# Patient Record
Sex: FEMALE | Race: WHITE | NOT HISPANIC OR LATINO | Employment: UNEMPLOYED | URBAN - METROPOLITAN AREA
[De-identification: names, ages, dates, MRNs, and addresses within clinical notes are randomized per-mention and may not be internally consistent; named-entity substitution may affect disease eponyms.]

---

## 2018-10-04 ENCOUNTER — OFFICE VISIT (OUTPATIENT)
Dept: URGENT CARE | Facility: MEDICAL CENTER | Age: 38
End: 2018-10-04
Payer: COMMERCIAL

## 2018-10-04 VITALS
OXYGEN SATURATION: 99 % | WEIGHT: 125 LBS | TEMPERATURE: 97.7 F | BODY MASS INDEX: 22.15 KG/M2 | HEIGHT: 63 IN | SYSTOLIC BLOOD PRESSURE: 131 MMHG | RESPIRATION RATE: 16 BRPM | DIASTOLIC BLOOD PRESSURE: 84 MMHG | HEART RATE: 80 BPM

## 2018-10-04 DIAGNOSIS — K04.7 DENTAL ABSCESS: Primary | ICD-10-CM

## 2018-10-04 PROCEDURE — G0382 LEV 3 HOSP TYPE B ED VISIT: HCPCS | Performed by: FAMILY MEDICINE

## 2018-10-04 PROCEDURE — 99283 EMERGENCY DEPT VISIT LOW MDM: CPT | Performed by: FAMILY MEDICINE

## 2018-10-04 RX ORDER — AMOXICILLIN 500 MG/1
500 TABLET, FILM COATED ORAL 3 TIMES DAILY
Qty: 30 TABLET | Refills: 0 | Status: SHIPPED | OUTPATIENT
Start: 2018-10-04 | End: 2018-10-14

## 2018-10-04 NOTE — PATIENT INSTRUCTIONS
1  Take Amox 500mg  2 as initial dose then 1 tablet 3x daily x 10 days  2  Motrin as needed for pain  3  Dental consult ASAP

## 2018-10-04 NOTE — PROGRESS NOTES
[facility]        NAME: Deborah Thomson is a 41 y o  female  : 1980    MRN: [id number]  DATE: 2018  TIME: 12:20 PM    Assessment and Plan   Dental abscess [K04 7]  1  Dental abscess  amoxicillin (AMOXIL) 500 MG tablet         Patient Instructions     1  Take Amox 500mg  2 as initial dose then 1 tablet 3x daily x 10 days  2  Motrin as needed for pain  3  Dental consult ASAP  Chief Complaint     Chief Complaint   Patient presents with    Dental Pain     lower jaw pain/swelling on left side/states sore throat/states getting fevers/Tylenol and Motrin rotation with no relief         History of Present Illness       The patient is a 51-year-old female presents with a 2 day history of increasing left-sided dental pain and jaw swelling  She denies any drainage from her gum or teeth but has had a low-grade fever over the past 24 hours  Review of Systems   Review of Systems   Constitutional: Positive for fever  HENT: Positive for dental problem and mouth sores  Respiratory: Negative for cough  Gastrointestinal: Negative  Current Medications       Current Outpatient Prescriptions:     buprenorphine-naloxone (SUBOXONE) 8-2 mg per SL tablet, Place 0 5 tablets under the tongue daily  , Disp: , Rfl:     amoxicillin (AMOXIL) 500 MG tablet, Take 1 tablet (500 mg total) by mouth 3 (three) times a day for 10 days, Disp: 30 tablet, Rfl: 0    Amphetamine-Dextroamphetamine (ADDERALL PO), Take 30 mg by mouth daily  , Disp: , Rfl:     Current Allergies     Allergies as of 10/04/2018 - Reviewed 10/04/2018   Allergen Reaction Noted    Bactrim [sulfamethoxazole-trimethoprim]  2016    Levaquin [levofloxacin in d5w]  2016    Toradol [ketorolac tromethamine]  2016            The following portions of the patient's history were reviewed and updated as appropriate: allergies, current medications, past family history, past medical history, past social history, past surgical history and problem list      Past Medical History:   Diagnosis Date    Drug abuse ([address] )     Psychiatric disorder     anxiety and ADHD       Past Surgical History:   Procedure Laterality Date     SECTION         No family history on file  Medications have been verified  Objective   /84   Pulse 80   Temp 97 7 °F (36 5 °C)   Resp 16   Ht 5' 3" (1 6 m)   Wt 56 7 kg (125 lb)   SpO2 99%   BMI 22 14 kg/m²        Physical Exam     Physical Exam   Constitutional: She appears well-developed and well-nourished  No distress  HENT:   Head: Normocephalic and atraumatic  Right Ear: Tympanic membrane normal    Left Ear: Tympanic membrane normal    Nose: Nose normal    Mouth/Throat:       Cardiovascular: Normal rate, regular rhythm and normal heart sounds  No murmur heard    Pulmonary/Chest: Effort normal and breath sounds normal

## 2019-09-11 ENCOUNTER — OFFICE VISIT (OUTPATIENT)
Dept: URGENT CARE | Facility: MEDICAL CENTER | Age: 39
End: 2019-09-11
Payer: COMMERCIAL

## 2019-09-11 VITALS
HEIGHT: 63 IN | WEIGHT: 143.8 LBS | HEART RATE: 86 BPM | BODY MASS INDEX: 25.48 KG/M2 | DIASTOLIC BLOOD PRESSURE: 80 MMHG | OXYGEN SATURATION: 100 % | SYSTOLIC BLOOD PRESSURE: 128 MMHG | RESPIRATION RATE: 18 BRPM | TEMPERATURE: 99 F

## 2019-09-11 DIAGNOSIS — K02.9 PAIN DUE TO DENTAL CARIES: Primary | ICD-10-CM

## 2019-09-11 PROCEDURE — G0382 LEV 3 HOSP TYPE B ED VISIT: HCPCS | Performed by: PHYSICIAN ASSISTANT

## 2019-09-11 PROCEDURE — 99283 EMERGENCY DEPT VISIT LOW MDM: CPT | Performed by: PHYSICIAN ASSISTANT

## 2019-09-11 PROCEDURE — 99213 OFFICE O/P EST LOW 20 MIN: CPT | Performed by: PHYSICIAN ASSISTANT

## 2019-09-11 RX ORDER — AMOXICILLIN AND CLAVULANATE POTASSIUM 875; 125 MG/1; MG/1
1 TABLET, FILM COATED ORAL EVERY 12 HOURS SCHEDULED
Qty: 20 TABLET | Refills: 0 | Status: SHIPPED | OUTPATIENT
Start: 2019-09-11 | End: 2019-09-21

## 2019-09-11 RX ORDER — AMOXICILLIN AND CLAVULANATE POTASSIUM 875; 125 MG/1; MG/1
1 TABLET, FILM COATED ORAL EVERY 12 HOURS SCHEDULED
Qty: 20 TABLET | Refills: 0 | Status: SHIPPED | OUTPATIENT
Start: 2019-09-11 | End: 2019-09-11 | Stop reason: CLARIF

## 2019-09-12 NOTE — PROGRESS NOTES
[facility]        NAME: Maira Wasserman is a 42 y o  female  : 1980    MRN: [id number]  DATE: 2019  TIME: 9:05 PM    Assessment and Plan   Pain due to dental caries [K02 9]  1  Pain due to dental caries  amoxicillin-clavulanate (AUGMENTIN) 875-125 mg per tablet    DISCONTINUED: amoxicillin-clavulanate (AUGMENTIN) 875-125 mg per tablet     List of dental clinics provided to patient  Advised to continue Motrin and take Augmentin as directed  Encouraged dental follow up ASAP  Patient Instructions       Continue to take Motrin for pain   take antibiotic as instructed   follow up with dentist ASAP      Chief Complaint     Chief Complaint   Patient presents with    Dental Pain     Lower dental pain x 5 days  History of Present Illness        Patient is a 32-year-old female who presents today with lower jaw dental pain  This began approximately 5 days ago  Patient has known dental issues and caries, she knows she needs these pole but has not had a chance to get to the dentist due to financial issues  She reports low-grade fever as well  She is able to swallow without difficulty  Review of Systems   Review of Systems   Constitutional: Positive for fever  HENT: Positive for dental problem  Negative for mouth sores and trouble swallowing  Respiratory: Negative for shortness of breath  Cardiovascular: Negative for chest pain  Current Medications       Current Outpatient Medications:     amoxicillin-clavulanate (AUGMENTIN) 875-125 mg per tablet, Take 1 tablet by mouth every 12 (twelve) hours for 10 days, Disp: 20 tablet, Rfl: 0    Amphetamine-Dextroamphetamine (ADDERALL PO), Take 30 mg by mouth daily  , Disp: , Rfl:     buprenorphine-naloxone (SUBOXONE) 8-2 mg per SL tablet, Place 0 5 tablets under the tongue daily  , Disp: , Rfl:     Current Allergies     Allergies as of 2019 - Reviewed 2019   Allergen Reaction Noted    Bactrim [sulfamethoxazole-trimethoprim]  2016    Levaquin [levofloxacin in d5w]  2016    Toradol [ketorolac tromethamine]  2016            The following portions of the patient's history were reviewed and updated as appropriate: allergies, current medications, past family history, past medical history, past social history, past surgical history and problem list      Past Medical History:   Diagnosis Date    Drug abuse ([facility] [address] )     Psychiatric disorder     anxiety and ADHD       Past Surgical History:   Procedure Laterality Date     SECTION      DENTAL SURGERY         Family History   Problem Relation Age of Onset    COPD Mother          Medications have been verified  Objective   /80   Pulse 86   Temp 99 °F (37 2 °C) (Temporal)   Resp 18   Ht 5' 3" (1 6 m)   Wt 65 2 kg (143 lb 12 8 oz)   LMP 2019 (Exact Date)   SpO2 100%   BMI 25 47 kg/m²        Physical Exam     Physical Exam   Constitutional: She appears well-developed and well-nourished  HENT:   Head: Normocephalic and atraumatic  Mouth/Throat: Uvula is midline, oropharynx is clear and moist and mucous membranes are normal  She has dentures  Abnormal dentition  Dental caries present  No dental abscesses  Cardiovascular: Normal rate and regular rhythm     Pulmonary/Chest: Effort normal and breath sounds normal

## 2019-09-12 NOTE — PATIENT INSTRUCTIONS
Continue to take Motrin for pain   take antibiotic as instructed   follow up with dentist ASAP    Dental Caries   WHAT YOU NEED TO KNOW:   Dental caries are also called cavities  Cavities are caused by bacteria  When the bacteria in tooth plaque (sticky film) mix with certain types of carbohydrate, this creates acid  The acid breaks down areas of enamel, which covers the outside of a tooth  This creates a small hole in the tooth called a cavity  DISCHARGE INSTRUCTIONS:   Return to the emergency department if:   · Your face, jaw, cheek, eye, or neck begin to swell  Contact your dentist if:   · You have a fever  · Your tooth pain gets worse  · You have questions or concerns about your condition or care  Follow up with your dentist as directed:  Write down your questions so you remember to ask them during your visits  Prevent dental caries:   · Brush your teeth at least 2 times a day with fluoride toothpaste  · Use dental floss to clean between your teeth at least once a day  · Rinse your mouth with water or mouthwash after meals and snacks  · Chew sugarless gum after meals and snacks  · See your dentist regularly for dental cleanings and oral exams  © 2017 [address] is for End User's use only and may not be sold, redistributed or otherwise used for commercial purposes  All illustrations and images included in CareNotes® are the copyrighted property of [employer]  or [employer] Burrough[employer]  The above information is an  only  It is not intended as medical advice for individual conditions or treatments  Talk to your doctor, nurse or pharmacist before following any medical regimen to see if it is safe and effective for you

## 2020-06-01 ENCOUNTER — OFFICE VISIT (OUTPATIENT)
Dept: URGENT CARE | Facility: MEDICAL CENTER | Age: 40
End: 2020-06-01
Payer: COMMERCIAL

## 2020-06-01 DIAGNOSIS — K04.7 DENTAL ABSCESS: Primary | ICD-10-CM

## 2020-06-01 PROCEDURE — G0382 LEV 3 HOSP TYPE B ED VISIT: HCPCS | Performed by: PHYSICIAN ASSISTANT

## 2020-06-01 PROCEDURE — 99213 OFFICE O/P EST LOW 20 MIN: CPT | Performed by: PHYSICIAN ASSISTANT

## 2020-06-01 PROCEDURE — 99283 EMERGENCY DEPT VISIT LOW MDM: CPT | Performed by: PHYSICIAN ASSISTANT

## 2020-06-02 ENCOUNTER — DOCUMENTATION (OUTPATIENT)
Dept: URGENT CARE | Facility: MEDICAL CENTER | Age: 40
End: 2020-06-02

## 2020-09-29 ENCOUNTER — OFFICE VISIT (OUTPATIENT)
Dept: URGENT CARE | Facility: MEDICAL CENTER | Age: 40
End: 2020-09-29
Payer: COMMERCIAL

## 2020-09-29 VITALS
TEMPERATURE: 97.7 F | BODY MASS INDEX: 26.19 KG/M2 | OXYGEN SATURATION: 98 % | SYSTOLIC BLOOD PRESSURE: 121 MMHG | WEIGHT: 153.4 LBS | HEIGHT: 64 IN | RESPIRATION RATE: 18 BRPM | DIASTOLIC BLOOD PRESSURE: 85 MMHG | HEART RATE: 94 BPM

## 2020-09-29 DIAGNOSIS — K04.7 DENTAL ABSCESS: Primary | ICD-10-CM

## 2020-09-29 PROCEDURE — 99213 OFFICE O/P EST LOW 20 MIN: CPT

## 2020-09-29 PROCEDURE — 99283 EMERGENCY DEPT VISIT LOW MDM: CPT

## 2020-09-29 PROCEDURE — G0382 LEV 3 HOSP TYPE B ED VISIT: HCPCS

## 2020-09-29 RX ORDER — AMOXICILLIN 500 MG/1
500 CAPSULE ORAL EVERY 8 HOURS SCHEDULED
Qty: 21 CAPSULE | Refills: 0 | Status: SHIPPED | OUTPATIENT
Start: 2020-09-29 | End: 2020-10-06

## 2020-09-29 NOTE — PROGRESS NOTES
[facility]        NAME: Fredi Suggs is a 47 y o  female  : 1980    MRN: [id number]  DATE: 2020  TIME: 5:42 PM    Assessment and Plan   Dental abscess [K04 7]  1  Dental abscess  amoxicillin (AMOXIL) 500 mg capsule         Patient Instructions     Dental abscess  Amoxicillin as directed  Follow up with PCP in 3-5 days  Proceed to  ER if symptoms worsen  Chief Complaint     Chief Complaint   Patient presents with    Dental Pain     LOWER RIGHT JAW         History of Present Illness       Patient c/o dental pain and swelling x 5 days  States she has dental decay and now it is swollen and painful  Denies chest pain, SOB      Review of Systems   Review of Systems   Constitutional: Negative  HENT: Positive for dental problem  Eyes: Negative  Respiratory: Negative  Negative for cough, chest tightness, shortness of breath, wheezing and stridor  Cardiovascular: Negative  Negative for chest pain, palpitations and leg swelling  Current Medications       Current Outpatient Medications:     amoxicillin (AMOXIL) 500 mg capsule, Take 1 capsule (500 mg total) by mouth every 8 (eight) hours for 7 days, Disp: 21 capsule, Rfl: 0    Amphetamine-Dextroamphetamine (ADDERALL PO), Take 30 mg by mouth daily  , Disp: , Rfl:     buprenorphine-naloxone (SUBOXONE) 8-2 mg per SL tablet, Place 0 5 tablets under the tongue daily  , Disp: , Rfl:     Current Allergies     Allergies as of 2020 - Reviewed 2020   Allergen Reaction Noted    Bactrim [sulfamethoxazole-trimethoprim]  2016    Levaquin [levofloxacin in d5w]  2016    Toradol [ketorolac tromethamine]  2016            The following portions of the patient's history were reviewed and updated as appropriate: allergies, current medications, past family history, past medical history, past social history, past surgical history and problem list      Past Medical History:   Diagnosis Date    Drug abuse [facility])     Psychiatric disorder     anxiety and ADHD       Past Surgical History:   Procedure Laterality Date     SECTION      DENTAL SURGERY         Family History   Problem Relation Age of Onset    COPD Mother          Medications have been verified  Objective   /85   Pulse 94   Temp 97 7 °F (36 5 °C)   Resp 18   Ht 5' 4" (1 626 m)   Wt 69 6 kg (153 lb 6 4 oz)   SpO2 98%   BMI 26 33 kg/m²        Physical Exam     Physical Exam  Constitutional:       Appearance: Normal appearance  She is well-developed and normal weight  HENT:      Head: Normocephalic and atraumatic  Right Ear: Tympanic membrane, ear canal and external ear normal       Left Ear: Tympanic membrane, ear canal and external ear normal       Nose: Nose normal       Mouth/Throat:      Dentition: Abnormal dentition  Dental tenderness, dental caries and dental abscesses present  Pharynx: No oropharyngeal exudate  Neck:      Musculoskeletal: Normal range of motion and neck supple  Cardiovascular:      Rate and Rhythm: Normal rate and regular rhythm  Heart sounds: Normal heart sounds  Pulmonary:      Effort: Pulmonary effort is normal  No respiratory distress  Breath sounds: Normal breath sounds  No wheezing or rales  Chest:      Chest wall: No tenderness  Lymphadenopathy:      Cervical: No cervical adenopathy  Neurological:      Mental Status: She is alert

## 2020-09-29 NOTE — PATIENT INSTRUCTIONS
Dental abscess  Amoxicillin as directed  Follow up with PCP in 3-5 days  Proceed to  ER if symptoms worsen  Dental Abscess   WHAT YOU NEED TO KNOW:   A dental abscess is a collection of pus in or around a tooth  A dental abscess is caused by bacteria  The bacteria usually enter the tooth when the enamel (outer part of the tooth) is damaged by tooth decay  Bacteria may also enter the tooth through a break or chip in the tooth, or a cut in the gum  Food particles that are stuck between the teeth for a long time may also lead to an abscess  DISCHARGE INSTRUCTIONS:   Return to the emergency department if:   · You have severe pain  · You have trouble breathing because of pain or swelling  Contact your healthcare provider if:   · Your symptoms get worse, even after treatment  · Your mouth is bleeding  · You cannot eat or drink because of pain or swelling  · Your abscess returns  · You have an injury that causes a crack in your tooth  · You have questions or concerns about your condition or care  Medicines: You may  need any of the following:  · Antibiotics  help treat a bacterial infection  · NSAIDs , such as ibuprofen, help decrease swelling, pain, and fever  This medicine is available with or without a doctor's order  NSAIDs can cause stomach bleeding or kidney problems in certain people  If you take blood thinner medicine, always ask your healthcare provider if NSAIDs are safe for you  Always read the medicine label and follow directions  · Acetaminophen  decreases pain and fever  It is available without a doctor's order  Ask how much to take and how often to take it  Follow directions  Read the labels of all other medicines you are using to see if they also contain acetaminophen, or ask your doctor or pharmacist  Acetaminophen can cause liver damage if not taken correctly  Do not use more than 4 grams (4,000 milligrams) total of acetaminophen in one day       · Prescription pain medicine  may be given  Ask your healthcare provider how to take this medicine safely  Some prescription pain medicines contain acetaminophen  Do not take other medicines that contain acetaminophen without talking to your healthcare provider  Too much acetaminophen may cause liver damage  Prescription pain medicine may cause constipation  Ask your healthcare provider how to prevent or treat constipation  · Take your medicine as directed  Contact your healthcare provider if you think your medicine is not helping or if you have side effects  Tell him of her if you are allergic to any medicine  Keep a list of the medicines, vitamins, and herbs you take  Include the amounts, and when and why you take them  Bring the list or the pill bottles to follow-up visits  Carry your medicine list with you in case of an emergency  Self-care:   · Rinse your mouth every 2 hours with salt water  This will help keep the area clean  · Gently brush your teeth twice a day with a soft tooth brush  This will help keep the area clean  · Eat soft foods as directed  Soft foods may cause less pain  Examples include applesauce, yogurt, and cooked pasta  Ask your healthcare provider how long to follow this instruction  · Apply a warm compress to your tooth or gum  Use a cotton ball or gauze soaked in warm water  Remove the compress in 10 minutes or when it becomes cool  Repeat 3 times a day  Prevent another abscess:   · Brush your teeth at least 2 times a day with fluoride toothpaste  · Use dental floss to clean between your teeth at least once a day  · Rinse your mouth with water or mouthwash after meals and snacks  · Chew sugarless gum after meals and snacks  · Limit foods that are sticky and high in sugar such as raisons  Also limit drinks high in sugar, such as soda  · See your dentist every 6 months for dental cleanings and oral exams  Follow up with your healthcare provider in 24 hours:   Your healthcare provider will need to check your teeth and gums  Write down your questions so you remember to ask them during your visits  © 2017 [facility] Information is for End User's use only and may not be sold, redistributed or otherwise used for commercial purposes  All illustrations and images included in CareNotes® are the copyrighted property of [employer]  or Al Burroughs  The above information is an  only  It is not intended as medical advice for individual conditions or treatments  Talk to your doctor, nurse or pharmacist before following any medical regimen to see if it is safe and effective for you

## 2020-10-15 ENCOUNTER — OFFICE VISIT (OUTPATIENT)
Dept: URGENT CARE | Facility: MEDICAL CENTER | Age: 40
End: 2020-10-15
Payer: COMMERCIAL

## 2020-10-15 VITALS
OXYGEN SATURATION: 98 % | WEIGHT: 158 LBS | BODY MASS INDEX: 28 KG/M2 | DIASTOLIC BLOOD PRESSURE: 63 MMHG | TEMPERATURE: 97.8 F | RESPIRATION RATE: 18 BRPM | SYSTOLIC BLOOD PRESSURE: 112 MMHG | HEIGHT: 63 IN | HEART RATE: 92 BPM

## 2020-10-15 DIAGNOSIS — K04.7 DENTAL ABSCESS: Primary | ICD-10-CM

## 2020-10-15 PROCEDURE — G0382 LEV 3 HOSP TYPE B ED VISIT: HCPCS | Performed by: PHYSICIAN ASSISTANT

## 2020-10-15 PROCEDURE — 99213 OFFICE O/P EST LOW 20 MIN: CPT | Performed by: PHYSICIAN ASSISTANT

## 2020-10-15 PROCEDURE — 99283 EMERGENCY DEPT VISIT LOW MDM: CPT | Performed by: PHYSICIAN ASSISTANT

## 2020-10-15 RX ORDER — IBUPROFEN 600 MG/1
600 TABLET ORAL EVERY 6 HOURS PRN
COMMUNITY

## 2020-10-15 RX ORDER — AMOXICILLIN AND CLAVULANATE POTASSIUM 875; 125 MG/1; MG/1
1 TABLET, FILM COATED ORAL EVERY 12 HOURS SCHEDULED
Qty: 14 TABLET | Refills: 0 | Status: SHIPPED | OUTPATIENT
Start: 2020-10-15 | End: 2020-10-22

## 2021-02-10 ENCOUNTER — OFFICE VISIT (OUTPATIENT)
Dept: URGENT CARE | Facility: MEDICAL CENTER | Age: 41
End: 2021-02-10
Payer: COMMERCIAL

## 2021-02-10 VITALS
RESPIRATION RATE: 18 BRPM | SYSTOLIC BLOOD PRESSURE: 127 MMHG | DIASTOLIC BLOOD PRESSURE: 73 MMHG | WEIGHT: 154 LBS | OXYGEN SATURATION: 97 % | TEMPERATURE: 98.2 F | HEIGHT: 64 IN | BODY MASS INDEX: 26.29 KG/M2 | HEART RATE: 90 BPM

## 2021-02-10 DIAGNOSIS — R21 RASH: Primary | ICD-10-CM

## 2021-02-10 PROCEDURE — 99213 OFFICE O/P EST LOW 20 MIN: CPT | Performed by: PHYSICIAN ASSISTANT

## 2021-02-10 PROCEDURE — G0382 LEV 3 HOSP TYPE B ED VISIT: HCPCS | Performed by: PHYSICIAN ASSISTANT

## 2021-02-10 PROCEDURE — 99283 EMERGENCY DEPT VISIT LOW MDM: CPT | Performed by: PHYSICIAN ASSISTANT

## 2021-02-10 RX ORDER — PREDNISONE 20 MG/1
40 TABLET ORAL DAILY
Qty: 10 TABLET | Refills: 0 | Status: SHIPPED | OUTPATIENT
Start: 2021-02-10 | End: 2021-02-15

## 2021-02-10 NOTE — PROGRESS NOTES
[facility]        NAME: Sorin Lund is a 46 y o  female  : 1980    MRN: [id number]  DATE: February 10, 2021  TIME: 2:20 PM    Assessment and Plan   Rash [R21]  1  Rash  predniSONE 20 mg tablet         Patient Instructions     Rash  Prednisone 40 mg daily x 5 days  Follow up with PCP in 3-5 days  Proceed to  ER if symptoms worsen  Chief Complaint     Chief Complaint   Patient presents with    Rash     x2 weeks rash started to back   itchy and  burns  History of Present Illness       47 y/o female presents c/o itchy rash to upper body  States the rash started after eating supplemental herbs  Denies wheezing, difficulty swallowing      Review of Systems   Review of Systems   Constitutional: Negative  HENT: Negative  Eyes: Negative  Respiratory: Negative  Negative for cough, chest tightness, shortness of breath, wheezing and stridor  Cardiovascular: Negative  Negative for chest pain, palpitations and leg swelling  Skin: Positive for rash           Current Medications       Current Outpatient Medications:     ibuprofen (MOTRIN) 600 mg tablet, Take 600 mg by mouth every 6 (six) hours as needed for mild pain, Disp: , Rfl:     predniSONE 20 mg tablet, Take 2 tablets (40 mg total) by mouth daily for 5 days, Disp: 10 tablet, Rfl: 0    Current Allergies     Allergies as of 02/10/2021 - Reviewed 02/10/2021   Allergen Reaction Noted    Bactrim [sulfamethoxazole-trimethoprim]  2016    Levaquin [levofloxacin in d5w]  2016    Toradol [ketorolac tromethamine]  2016            The following portions of the patient's history were reviewed and updated as appropriate: allergies, current medications, past family history, past medical history, past social history, past surgical history and problem list      Past Medical History:   Diagnosis Date    Drug abuse ([facility] [address] )     Psychiatric disorder     anxiety and ADHD       Past Surgical History:   Procedure Laterality Date     SECTION      DENTAL SURGERY         Family History   Problem Relation Age of Onset    COPD Mother          Medications have been verified  Objective   /73   Pulse 90   Temp 98 2 °F (36 8 °C)   Resp 18   Ht 5' 4" (1 626 m)   Wt 69 9 kg (154 lb)   SpO2 97%   BMI 26 43 kg/m²        Physical Exam     Physical Exam  Constitutional:       General: She is not in acute distress  Appearance: She is well-developed  She is not diaphoretic  HENT:      Head: Normocephalic and atraumatic  Right Ear: Hearing, tympanic membrane, ear canal and external ear normal       Left Ear: Hearing, tympanic membrane, ear canal and external ear normal       Mouth/Throat:      Pharynx: Uvula midline  Eyes:      Conjunctiva/sclera: Conjunctivae normal       Pupils: Pupils are equal, round, and reactive to light  Neck:      Musculoskeletal: Normal range of motion and neck supple  Cardiovascular:      Rate and Rhythm: Normal rate and regular rhythm  Heart sounds: Normal heart sounds  Pulmonary:      Effort: Pulmonary effort is normal       Breath sounds: Normal breath sounds  Lymphadenopathy:      Cervical: No cervical adenopathy  Skin:     Findings: Rash present   Rash is urticarial

## 2021-02-10 NOTE — PATIENT INSTRUCTIONS
Rash  Prednisone 40 mg daily x 5 days  Follow up with PCP in 3-5 days  Proceed to  ER if symptoms worsenAcute Rash   WHAT YOU NEED TO KNOW:   A rash is irritated, red, or itchy skin or mucus membranes, such as the lining of your nose or throat  Acute means the rash starts suddenly, worsens quickly, and lasts a short time  Common causes include a disease or infection, a reaction to something you are allergic to, or certain medicines  DISCHARGE INSTRUCTIONS:   Return to the emergency department if:   · You have sudden trouble breathing or chest pain  · You are vomiting, have a headache or muscle aches, and your throat hurts  Call your doctor or dermatologist if:   · You have a fever  · You get open wounds from scratching your skin, or you have a wound that is red, swollen, or painful  · Your rash lasts longer than 3 months  · You have swelling or pain in your joints  · You have questions or concerns about your condition or care  Medicines:  If your rash does not go away on its own, you may need the following medicines:  · Antihistamines  may be given to help decrease itching  · Steroids  may be given to decrease inflammation  · Antibiotics  help fight or prevent a bacterial infection  · Take your medicine as directed  Contact your healthcare provider if you think your medicine is not helping or if you have side effects  Tell him of her if you are allergic to any medicine  Keep a list of the medicines, vitamins, and herbs you take  Include the amounts, and when and why you take them  Bring the list or the pill bottles to follow-up visits  Carry your medicine list with you in case of an emergency  Prevent a rash or care for your skin when you have a rash:  Dry skin can lead to more problems  Do not scratch your skin if it itches  You may cause a skin infection by scratching  The following may prevent dry skin, and help your skin look better:  · Help soothe your rash    Apply thick cream lotions or petroleum jelly  Cool compresses may also soothe your skin  Apply a cool compress or a cool, wet towel, and then cover it with a dry towel  · Use lukewarm water when you bathe  Hot water may damage your skin more  Pat your skin dry  Do not rub your skin with a towel  · Use detergents, soaps, shampoos, and bubble baths  made for sensitive skin  · Wear clothes made of cotton instead of nylon or wool  Cotton is softer, so it will not hurt your skin as much  Follow up with your healthcare providers as directed:  A dermatologist may help find the cause of your rash or help plan or change treatment  A dietitian may help with meal planning if you have a food allergy  Write down your questions so you remember to ask them during your visits  © Copyright [facility] [address] Information is for End User's use only and may not be sold, redistributed or otherwise used for commercial purposes  All illustrations and images included in CareNotes® are the copyrighted property of [employer]  or [facility] Nick Jacobo   The above information is an  only  It is not intended as medical advice for individual conditions or treatments  Talk to your doctor, nurse or pharmacist before following any medical regimen to see if it is safe and effective for you

## 2021-03-11 ENCOUNTER — OFFICE VISIT (OUTPATIENT)
Dept: URGENT CARE | Facility: MEDICAL CENTER | Age: 41
End: 2021-03-11

## 2021-03-11 VITALS
WEIGHT: 160 LBS | DIASTOLIC BLOOD PRESSURE: 74 MMHG | OXYGEN SATURATION: 97 % | HEIGHT: 64 IN | BODY MASS INDEX: 27.31 KG/M2 | TEMPERATURE: 98.2 F | RESPIRATION RATE: 18 BRPM | SYSTOLIC BLOOD PRESSURE: 121 MMHG | HEART RATE: 94 BPM

## 2021-03-11 DIAGNOSIS — M62.838 MUSCLE SPASMS OF NECK: Primary | ICD-10-CM

## 2021-03-11 RX ORDER — CYCLOBENZAPRINE HCL 10 MG
10 TABLET ORAL 3 TIMES DAILY
Qty: 15 TABLET | Refills: 0 | Status: SHIPPED | OUTPATIENT
Start: 2021-03-11 | End: 2021-03-16

## 2021-03-11 NOTE — PATIENT INSTRUCTIONS
1  Motrin as needed for pain  2  Take Flexeril 10mg  Every 8 hours as needed for back,neck spasm  3  Apply heat to upper back and neck 10-15min 3-4x daily  4   Follow up with PCP in 3-5 days if symptoms persist

## 2021-03-11 NOTE — PROGRESS NOTES
[facility]        NAME: Makenna Clay is a 46 y o  female  : 1980    MRN: [id number]  DATE: 2021  TIME: 5:47 PM    Assessment and Plan   Muscle spasms of neck [M62 838]  1  Muscle spasms of neck  cyclobenzaprine (FLEXERIL) 10 mg tablet         Patient Instructions     1  Motrin as needed for pain  2  Take Flexeril 10mg  Every 8 hours as needed for back,neck spasm  3  Apply heat to upper back and neck 10-15min 3-4x daily  4  Follow up with PCP in 3-5 days if symptoms persist       Chief Complaint     Chief Complaint   Patient presents with    Neck Pain     picked up a tv on  , and the neck pain that started today, also has pain ib the collar bone that is radiating both arms          History of Present Illness       Lanie Gould   Is a 82-year-old female presents with a 1 day history of left-sided upper back and neck pain  Patient reports she picked up a bag earlier today and felt discomfort in her left shoulder, upper back and neck region  Patient denies any weakness but had had symptoms of numbness and tingling right after the event  Patient denies any prior back, shoulder neck injuries  Review of Systems   Review of Systems   Constitutional: Negative  HENT: Negative  Respiratory: Negative  Gastrointestinal: Negative  Musculoskeletal: Positive for back pain and neck pain  Neurological: Negative for weakness and numbness           Current Medications       Current Outpatient Medications:     ibuprofen (MOTRIN) 600 mg tablet, Take 600 mg by mouth every 6 (six) hours as needed for mild pain, Disp: , Rfl:     cyclobenzaprine (FLEXERIL) 10 mg tablet, Take 1 tablet (10 mg total) by mouth 3 (three) times a day for 5 days, Disp: 15 tablet, Rfl: 0    Current Allergies     Allergies as of 2021 - Reviewed 2021   Allergen Reaction Noted    Bactrim [sulfamethoxazole-trimethoprim]  2016    Levaquin [levofloxacin in d5w]  2016            The following portions of the patient's history were reviewed and updated as appropriate: allergies, current medications, past family history, past medical history, past social history, past surgical history and problem list      Past Medical History:   Diagnosis Date    Drug abuse ([facility] )     Psychiatric disorder     anxiety and ADHD    Substance abuse ([facility] )        Past Surgical History:   Procedure Laterality Date     SECTION      DENTAL SURGERY         Family History   Problem Relation Age of Onset    COPD Mother          Medications have been verified  Objective   /74   Pulse 94   Temp 98 2 °F (36 8 °C)   Resp 18   Ht 5' 4" (1 626 m)   Wt 72 6 kg (160 lb)   LMP 2021   SpO2 97%   BMI 27 46 kg/m²   Patient's last menstrual period was 2021  Physical Exam     Physical Exam  Constitutional:       General: She is not in acute distress  Appearance: Normal appearance  Neck:      Musculoskeletal: Normal range of motion  Muscular tenderness present  No neck rigidity, pain with movement or spinous process tenderness  Cardiovascular:      Rate and Rhythm: Normal rate and regular rhythm  Heart sounds: Normal heart sounds  No murmur  Pulmonary:      Effort: Pulmonary effort is normal       Breath sounds: Normal breath sounds  Musculoskeletal:        Arms:    Neurological:      Mental Status: She is alert  Sensory: Sensation is intact  Motor: Motor function is intact

## 2021-07-29 ENCOUNTER — OFFICE VISIT (OUTPATIENT)
Dept: URGENT CARE | Facility: MEDICAL CENTER | Age: 41
End: 2021-07-29
Payer: COMMERCIAL

## 2021-07-29 VITALS
BODY MASS INDEX: 27.83 KG/M2 | TEMPERATURE: 98.2 F | SYSTOLIC BLOOD PRESSURE: 110 MMHG | OXYGEN SATURATION: 96 % | HEART RATE: 86 BPM | DIASTOLIC BLOOD PRESSURE: 60 MMHG | WEIGHT: 163 LBS | HEIGHT: 64 IN

## 2021-07-29 DIAGNOSIS — K04.7 DENTAL ABSCESS: Primary | ICD-10-CM

## 2021-07-29 PROCEDURE — 99213 OFFICE O/P EST LOW 20 MIN: CPT | Performed by: PHYSICIAN ASSISTANT

## 2021-07-29 RX ORDER — AMOXICILLIN AND CLAVULANATE POTASSIUM 875; 125 MG/1; MG/1
1 TABLET, FILM COATED ORAL EVERY 12 HOURS SCHEDULED
Qty: 14 TABLET | Refills: 0 | Status: SHIPPED | OUTPATIENT
Start: 2021-07-29 | End: 2021-08-05

## 2021-07-30 NOTE — PROGRESS NOTES
[facility]        NAME: Tracey Joshua is a 46 y o  female  : 1980    MRN: [id number]  DATE: 2021  TIME: 8:58 PM    Assessment and Plan   Dental abscess [K04 7]  1  Dental abscess  amoxicillin-clavulanate (AUGMENTIN) 875-125 mg per tablet         Patient Instructions     Continue Motrin   may apply ice to jaw   Augmentin as directed   follow-up with dentist  Follow up with PCP in 3-5 days  Proceed to  ER if symptoms worsen  Chief Complaint     Chief Complaint   Patient presents with    Abscess     tooth pain         History of Present Illness        Patient is a 27-year-old female who presents today with complaints of left lower jaw dental pain for the past few days  Reports possible abscess and left-sided facial swelling  No fevers or chills  Patient is a smoker  She states she had a cap on the tooth that hurts but states it came off  She does plan on following up with the dentist       Review of Systems   Review of Systems   Constitutional: Negative for chills and fever  HENT: Positive for dental problem and facial swelling  Negative for drooling and trouble swallowing            Current Medications       Current Outpatient Medications:     ibuprofen (MOTRIN) 600 mg tablet, Take 600 mg by mouth every 6 (six) hours as needed for mild pain, Disp: , Rfl:     amoxicillin-clavulanate (AUGMENTIN) 875-125 mg per tablet, Take 1 tablet by mouth every 12 (twelve) hours for 7 days, Disp: 14 tablet, Rfl: 0    cyclobenzaprine (FLEXERIL) 10 mg tablet, Take 1 tablet (10 mg total) by mouth 3 (three) times a day for 5 days, Disp: 15 tablet, Rfl: 0    Current Allergies     Allergies as of 2021 - Reviewed 2021   Allergen Reaction Noted    Bactrim [sulfamethoxazole-trimethoprim]  2016    Levaquin [levofloxacin in d5w]  2016            The following portions of the patient's history were reviewed and updated as appropriate: allergies, current medications, past family history, past medical history, past social history, past surgical history and problem list      Past Medical History:   Diagnosis Date    [facility] ([facility] )     Psychiatric disorder     anxiety and ADHD    Substance abuse ([facility] )        Past Surgical History:   Procedure Laterality Date     SECTION      DENTAL SURGERY         Family History   Problem Relation Age of Onset    COPD Mother          Medications have been verified  Objective   /60   Pulse 86   Temp 98 2 °F (36 8 °C) (Temporal)   Ht 5' 4" (1 626 m)   Wt 73 9 kg (163 lb)   SpO2 96%   BMI 27 98 kg/m²        Physical Exam     Physical Exam  Constitutional:       General: She is not in acute distress  Appearance: Normal appearance  She is not ill-appearing  HENT:      Head:      Jaw: Swelling present  Mouth/Throat:      Lips: Pink  Mouth: Mucous membranes are moist  No oral lesions  Dentition: Abnormal dentition  Dental tenderness, gingival swelling, dental caries and dental abscesses present  Pharynx: Oropharynx is clear  Uvula midline  No pharyngeal swelling, oropharyngeal exudate, posterior oropharyngeal erythema or uvula swelling  Tonsils: No tonsillar exudate or tonsillar abscesses  0 on the right  0 on the left  Cardiovascular:      Rate and Rhythm: Normal rate and regular rhythm  Pulmonary:      Effort: Pulmonary effort is normal       Breath sounds: Normal breath sounds  Skin:     General: Skin is warm and dry  Neurological:      Mental Status: She is alert

## 2021-08-28 ENCOUNTER — OFFICE VISIT (OUTPATIENT)
Dept: URGENT CARE | Facility: MEDICAL CENTER | Age: 41
End: 2021-08-28
Payer: COMMERCIAL

## 2021-08-28 VITALS
OXYGEN SATURATION: 96 % | TEMPERATURE: 97.9 F | HEART RATE: 88 BPM | WEIGHT: 163 LBS | HEIGHT: 64 IN | BODY MASS INDEX: 27.83 KG/M2 | RESPIRATION RATE: 18 BRPM

## 2021-08-28 DIAGNOSIS — J02.9 SORE THROAT: Primary | ICD-10-CM

## 2021-08-28 DIAGNOSIS — R05.9 COUGH: ICD-10-CM

## 2021-08-28 DIAGNOSIS — R50.9 FEVER, UNSPECIFIED FEVER CAUSE: ICD-10-CM

## 2021-08-28 PROCEDURE — 87635 SARS-COV-2 COVID-19 AMP PRB: CPT | Performed by: PHYSICIAN ASSISTANT

## 2021-08-28 NOTE — PATIENT INSTRUCTIONS
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
## 2021-08-29 LAB — SARS-COV-2 RNA RESP QL NAA+PROBE: NEGATIVE

## 2021-10-01 ENCOUNTER — OFFICE VISIT (OUTPATIENT)
Dept: URGENT CARE | Facility: MEDICAL CENTER | Age: 41
End: 2021-10-01
Payer: COMMERCIAL

## 2021-10-01 VITALS
WEIGHT: 160 LBS | TEMPERATURE: 97.7 F | HEIGHT: 64 IN | BODY MASS INDEX: 27.31 KG/M2 | OXYGEN SATURATION: 97 % | HEART RATE: 93 BPM

## 2021-10-01 DIAGNOSIS — J02.9 SORE THROAT: Primary | ICD-10-CM

## 2021-10-01 DIAGNOSIS — J02.9 ACUTE VIRAL PHARYNGITIS: ICD-10-CM

## 2021-10-01 LAB — S PYO AG THROAT QL: NEGATIVE

## 2021-10-01 PROCEDURE — 87070 CULTURE OTHR SPECIMN AEROBIC: CPT | Performed by: PHYSICIAN ASSISTANT

## 2021-10-01 PROCEDURE — U0003 INFECTIOUS AGENT DETECTION BY NUCLEIC ACID (DNA OR RNA); SEVERE ACUTE RESPIRATORY SYNDROME CORONAVIRUS 2 (SARS-COV-2) (CORONAVIRUS DISEASE [COVID-19]), AMPLIFIED PROBE TECHNIQUE, MAKING USE OF HIGH THROUGHPUT TECHNOLOGIES AS DESCRIBED BY CMS-2020-01-R: HCPCS | Performed by: PHYSICIAN ASSISTANT

## 2021-10-01 PROCEDURE — 99213 OFFICE O/P EST LOW 20 MIN: CPT | Performed by: PHYSICIAN ASSISTANT

## 2021-10-01 PROCEDURE — U0005 INFEC AGEN DETEC AMPLI PROBE: HCPCS | Performed by: PHYSICIAN ASSISTANT

## 2021-10-01 PROCEDURE — 87880 STREP A ASSAY W/OPTIC: CPT

## 2021-10-01 RX ORDER — AMOXICILLIN 500 MG/1
500 CAPSULE ORAL EVERY 8 HOURS SCHEDULED
Qty: 21 CAPSULE | Refills: 0 | Status: SHIPPED | OUTPATIENT
Start: 2021-10-01 | End: 2021-10-01 | Stop reason: SDUPTHER

## 2021-10-01 RX ORDER — AMOXICILLIN 500 MG/1
500 CAPSULE ORAL EVERY 8 HOURS SCHEDULED
Qty: 21 CAPSULE | Refills: 0 | Status: SHIPPED | OUTPATIENT
Start: 2021-10-01 | End: 2021-10-08

## 2021-10-02 LAB — SARS-COV-2 RNA RESP QL NAA+PROBE: NEGATIVE

## 2021-10-03 LAB — BACTERIA THROAT CULT: NORMAL

## 2022-02-12 ENCOUNTER — OFFICE VISIT (OUTPATIENT)
Dept: URGENT CARE | Facility: MEDICAL CENTER | Age: 42
End: 2022-02-12
Payer: COMMERCIAL

## 2022-02-12 VITALS
OXYGEN SATURATION: 100 % | RESPIRATION RATE: 18 BRPM | TEMPERATURE: 97.9 F | BODY MASS INDEX: 27.31 KG/M2 | HEIGHT: 64 IN | HEART RATE: 92 BPM | WEIGHT: 160 LBS

## 2022-02-12 DIAGNOSIS — J06.9 ACUTE URI: Primary | ICD-10-CM

## 2022-02-12 PROCEDURE — U0005 INFEC AGEN DETEC AMPLI PROBE: HCPCS | Performed by: PHYSICIAN ASSISTANT

## 2022-02-12 PROCEDURE — U0003 INFECTIOUS AGENT DETECTION BY NUCLEIC ACID (DNA OR RNA); SEVERE ACUTE RESPIRATORY SYNDROME CORONAVIRUS 2 (SARS-COV-2) (CORONAVIRUS DISEASE [COVID-19]), AMPLIFIED PROBE TECHNIQUE, MAKING USE OF HIGH THROUGHPUT TECHNOLOGIES AS DESCRIBED BY CMS-2020-01-R: HCPCS | Performed by: PHYSICIAN ASSISTANT

## 2022-02-12 PROCEDURE — 99213 OFFICE O/P EST LOW 20 MIN: CPT | Performed by: PHYSICIAN ASSISTANT

## 2022-02-12 NOTE — PATIENT INSTRUCTIONS
Upper respiratory infection  covid test sent  Tessalon pearls  Follow up with PCP in 3-5 days  Proceed to  ER if symptoms worsen  [address]    Your healthcare provider and/or public health staff have evaluated you and have determined that you do not need to remain in the hospital at this time  At this time you can be isolated at home where you will be monitored by staff from your local or state health department  You should carefully follow the prevention and isolation steps below until a healthcare provider or local or state health department says that you can return to your normal activities  Stay home except to get medical care    People who are mildly ill with COVID-19 are able to isolate at home during their illness  You should restrict activities outside your home, except for getting medical care  Do not go to work, school, or public areas  Avoid using public transportation, ride-sharing, or taxis  Separate yourself from other people and animals in your home    People: As much as possible, you should stay in a specific room and away from other people in your home  Also, you should use a separate bathroom, if available  Animals: You should restrict contact with pets and other animals while you are sick with COVID-19, just like you would around other people  Although there have not been reports of pets or other animals becoming sick with COVID-19, it is still recommended that people sick with COVID-19 limit contact with animals until more information is known about the virus  When possible, have another member of your household care for your animals while you are sick  If you are sick with COVID-19, avoid contact with your pet, including petting, snuggling, being kissed or licked, and sharing food  If you must care for your pet or be around animals while you are sick, wash your hands before and after you interact with pets and wear a facemask   See COVID-19 and Animals for more information  Call ahead before visiting your doctor    If you have a medical appointment, call the healthcare provider and tell them that you have or may have COVID-19  This will help the healthcare providers office take steps to keep other people from getting infected or exposed  Wear a facemask    You should wear a facemask when you are around other people (e g , sharing a room or vehicle) or pets and before you enter a healthcare providers office  If you are not able to wear a facemask (for example, because it causes trouble breathing), then people who live with you should not stay in the same room with you, or they should wear a facemask if they enter your room  Cover your coughs and sneezes    Cover your mouth and nose with a tissue when you cough or sneeze  Throw used tissues in a lined trash can  Immediately wash your hands with soap and water for at least 20 seconds or, if soap and water are not available, clean your hands with an alcohol-based hand  that contains at least 60% alcohol  Clean your hands often    Wash your hands often with soap and water for at least 20 seconds, especially after blowing your nose, coughing, or sneezing; going to the bathroom; and before eating or preparing food  If soap and water are not readily available, use an alcohol-based hand  with at least 60% alcohol, covering all surfaces of your hands and rubbing them together until they feel dry  Soap and water are the best option if hands are visibly dirty  Avoid touching your eyes, nose, and mouth with unwashed hands  Avoid sharing personal household items    You should not share dishes, drinking glasses, cups, eating utensils, towels, or bedding with other people or pets in your home  After using these items, they should be washed thoroughly with soap and water      Clean all high-touch surfaces everyday    High touch surfaces include counters, tabletops, doorknobs, bathroom fixtures, toilets, phones, keyboards, tablets, and bedside tables  Also, clean any surfaces that may have blood, stool, or body fluids on them  Use a household cleaning spray or wipe, according to the label instructions  Labels contain instructions for safe and effective use of the cleaning product including precautions you should take when applying the product, such as wearing gloves and making sure you have good ventilation during use of the product  Monitor your symptoms    Seek prompt medical attention if your illness is worsening (e g , difficulty breathing)  Before seeking care, call your healthcare provider and tell them that you have, or are being evaluated for, COVID-19  Put on a facemask before you enter the facility  These steps will help the healthcare providers office to keep other people in the office or waiting room from getting infected or exposed  Ask your healthcare provider to call the local or [facility] health department  Persons who are placed under active monitoring or facilitated self-monitoring should follow instructions provided by their local health department or occupational health professionals, as appropriate  If you have a medical emergency and need to call 911, notify the dispatch personnel that you have, or are being evaluated for COVID-19  If possible, put on a facemask before emergency medical services arrive      Discontinuing home isolation    Patients with confirmed COVID-19 should remain under home isolation precautions until the following conditions are met:   - They have had no fever for at least 24 hours (that is one full day of no fever without the use medicine that reduces fevers)  AND  - other symptoms have improved (for example, when their cough or shortness of breath have improved)  AND  - If had mild or moderate illness, at least 10 days have passed since their symptoms first appeared or if severe illness (needed oxygen) or immunosuppressed, at least 20 days have passed since symptoms first appeared  Patients with confirmed COVID-19 should also notify close contacts (including their workplace) and ask that they self-quarantine  Currently, close contact is defined as being within 6 feet for 15 minutes or more from the period 24 hours starting 48 hours before symptom onset to the time at which the patient went into isolation  Close contacts of patients diagnosed with COVID-19 should be instructed by the patient to self-quarantine for 14 days from the last time of their last contact with the patient       Source: [employer]

## 2022-02-12 NOTE — PROGRESS NOTES
[facility]        NAME: Margaret Loja is a 38 y o  female  : 1980    MRN: [id number]  DATE: 2022  TIME: 6:50 PM    Assessment and Plan   Acute URI [J06 9]  1  Acute URI  COVID Only -Office Collect         Patient Instructions     Upper respiratory infection  covid test sent  Tessalon pearls  Follow up with PCP in 3-5 days  Proceed to  ER if symptoms worsen  Chief Complaint     Chief Complaint   Patient presents with   Robert Fried Like Symptoms     cleaned a family of raccoons out of their attick and dust and poop dust was flying everywhere and they breathed it in and now they all have sore throat and cough and fever     Cough    Sore Throat    Fever         History of Present Illness        39 Y/o female presents c/o sore throat, non productive cough, congestion, runny nose and fever x 2 days      Review of Systems   Review of Systems   Constitutional: Positive for fever  Negative for activity change, appetite change, chills, diaphoresis and fatigue  HENT: Positive for congestion, ear pain, rhinorrhea and sore throat  Negative for ear discharge, facial swelling, hearing loss, mouth sores, nosebleeds, postnasal drip, sinus pressure, sinus pain, sneezing and voice change  Respiratory: Positive for cough  Negative for apnea, choking, chest tightness, shortness of breath, wheezing and stridor  Cardiovascular: Negative            Current Medications       Current Outpatient Medications:     ibuprofen (MOTRIN) 600 mg tablet, Take 600 mg by mouth every 6 (six) hours as needed for mild pain, Disp: , Rfl:     cyclobenzaprine (FLEXERIL) 10 mg tablet, Take 1 tablet (10 mg total) by mouth 3 (three) times a day for 5 days (Patient not taking: Reported on 10/1/2021), Disp: 15 tablet, Rfl: 0    Current Allergies     Allergies as of 2022 - Reviewed 2022   Allergen Reaction Noted    Bactrim [sulfamethoxazole-trimethoprim]  2016    Levaquin [levofloxacin in d5w] 2016            The following portions of the patient's history were reviewed and updated as appropriate: allergies, current medications, past family history, past medical history, past social history, past surgical history and problem list      Past Medical History:   Diagnosis Date    [facility] ([facility] )     Psychiatric disorder     anxiety and ADHD    Substance abuse ([facility] )        Past Surgical History:   Procedure Laterality Date     SECTION      DENTAL SURGERY         Family History   Problem Relation Age of Onset    COPD Mother          Medications have been verified  Objective   Pulse 92   Temp 97 9 °F (36 6 °C)   Resp 18   Ht 5' 4" (1 626 m)   Wt 72 6 kg (160 lb)   SpO2 100%   BMI 27 46 kg/m²        Physical Exam     Physical Exam  Constitutional:       General: She is not in acute distress  Appearance: She is well-developed  She is not diaphoretic  HENT:      Head: Normocephalic and atraumatic  Right Ear: Hearing, tympanic membrane, ear canal and external ear normal       Left Ear: Hearing, tympanic membrane, ear canal and external ear normal       Nose: Rhinorrhea present  Mouth/Throat:      Pharynx: Uvula midline  Cardiovascular:      Rate and Rhythm: Normal rate and regular rhythm  Heart sounds: Normal heart sounds  Pulmonary:      Effort: Pulmonary effort is normal       Breath sounds: Normal breath sounds  Musculoskeletal:      Cervical back: Normal range of motion and neck supple  Lymphadenopathy:      Cervical: Cervical adenopathy present

## 2022-02-13 LAB — SARS-COV-2 RNA RESP QL NAA+PROBE: NEGATIVE

## 2022-02-13 RX ORDER — BENZONATATE 100 MG/1
100 CAPSULE ORAL 3 TIMES DAILY PRN
Qty: 20 CAPSULE | Refills: 0 | Status: SHIPPED | OUTPATIENT
Start: 2022-02-13

## 2022-03-08 ENCOUNTER — TELEPHONE (OUTPATIENT)
Dept: FAMILY MEDICINE CLINIC | Facility: MEDICAL CENTER | Age: 42
End: 2022-03-08

## 2022-03-08 NOTE — TELEPHONE ENCOUNTER
Pt is assigned to our office with [employer]  She has never been seen  Can you please offer an appt to establish?

## 2023-02-07 ENCOUNTER — VBI (OUTPATIENT)
Dept: ADMINISTRATIVE | Facility: OTHER | Age: 43
End: 2023-02-07

## 2025-02-18 ENCOUNTER — TELEPHONE (OUTPATIENT)
Dept: PSYCHIATRY | Facility: CLINIC | Age: 45
End: 2025-02-18

## 2025-02-24 ENCOUNTER — TELEPHONE (OUTPATIENT)
Dept: PSYCHIATRY | Facility: CLINIC | Age: 45
End: 2025-02-24

## 2025-02-24 ENCOUNTER — OFFICE VISIT (OUTPATIENT)
Dept: OTHER | Age: 45
End: 2025-02-24
Payer: COMMERCIAL

## 2025-02-24 VITALS
SYSTOLIC BLOOD PRESSURE: 115 MMHG | DIASTOLIC BLOOD PRESSURE: 75 MMHG | HEIGHT: 64 IN | HEART RATE: 101 BPM | BODY MASS INDEX: 27.46 KG/M2

## 2025-02-24 DIAGNOSIS — F11.20 OPIOID USE DISORDER, SEVERE, DEPENDENCE (HCC): Primary | ICD-10-CM

## 2025-02-24 PROCEDURE — 99204 OFFICE O/P NEW MOD 45 MIN: CPT | Performed by: EMERGENCY MEDICINE

## 2025-02-24 RX ORDER — BUPRENORPHINE AND NALOXONE 8; 2 MG/1; MG/1
8 FILM, SOLUBLE BUCCAL; SUBLINGUAL 2 TIMES DAILY
Qty: 60 FILM | Refills: 0 | Status: SHIPPED | OUTPATIENT
Start: 2025-02-24 | End: 2025-02-24

## 2025-02-24 RX ORDER — BUPRENORPHINE AND NALOXONE 8; 2 MG/1; MG/1
8 FILM, SOLUBLE BUCCAL; SUBLINGUAL 2 TIMES DAILY
Qty: 60 FILM | Refills: 0 | Status: SHIPPED | OUTPATIENT
Start: 2025-02-24

## 2025-02-24 NOTE — TELEPHONE ENCOUNTER
Patient contacted the [facility] office requesting to be scheduled with Dr Lambert for pain management treatment, patient is requesting Suboxone, writer confirmed with provider that patient must have an assessment first to determine which.

## 2025-02-24 NOTE — PROGRESS NOTES
[facility]     History and Physical  Denise Richards 45 y.o. female MRN: [id number]   @ Encounter: [id number]       1. Opioid use disorder, severe, dependence (HCC)  -     buprenorphine-naloxone (Suboxone) 8-2 mg; Place 1 Film (8 mg total) under the tongue 2 (two) times a day  -     naloxone (NARCAN) 4 mg/0.1 mL nasal spray; Administer 1 spray into a nostril. If no response after 2-3 minutes, give another dose in the other nostril using a new spray.      In addition to the above recommendations, the patient will also be referred to the [facility]'s Certified Addiction Counselors for additional evaluation and psychotherapy. We will also engage our Certified  for patient support.       HPI: Denise Richards is a 45 y.o. year old female who presents with OUD. She Received her first oxycodone Rx in 2003 due to dental pain. After receiving a few Rx from the dentist, her use escalated and she started going to various emergency depts throughout the region in order to get percocet. After several years of doing this, she became recognized at many ED's and was no longer able to get Rx through them. She then started buying them on the street and was taking 20mg oxy 4 times per day typically. She used via PO. No IVDU or insufflation of the pills. She has occasionally bought heroin when the oxycodone was not available, and would use that via insufflation. In 2010, she was started on buprenorphine through a program in [place]. From that point on, she has had 3 treatment episodes with buprenorphine MOUD and episodes of recurrence of use in between. Her most recent treatment episode was [id number]. She weaned herself off buprenorphine and did well until last year when she suffered a pneumothorax requiring thoracotomy and pleurodesis. She received oxycodone Rx for post op pain. Since then, she has been buying both buprenorphine and oxycodone and using them both intermittently. Her last dose of oxycodone was  yesterday AM and her last dose of buprenorphine was a 1-2mg dose last evening, which she tolerated well without precipitated withdrawal.         Review of PDMP:     PDMP Review         Value Time User    PDMP Reviewed  Yes 2025  8:16 AM Willian Lambert DO               Social History     Tobacco Use   Smoking Status Every Day    Current packs/day: 0.75    Average packs/day: 0.8 packs/day for 19.0 years (14.3 ttl pk-yrs)    Types: Cigarettes   Smokeless Tobacco Never        Review of Systems   Constitutional:  Negative for chills and fever.   HENT:  Negative for ear pain and sore throat.    Eyes:  Negative for pain and visual disturbance.   Respiratory:  Negative for cough and shortness of breath.    Cardiovascular:  Negative for chest pain and palpitations.   Gastrointestinal:  Negative for abdominal pain and vomiting.   Genitourinary:  Negative for dysuria and hematuria.   Musculoskeletal:  Negative for arthralgias and back pain.        R shoulder pain   Skin:  Negative for color change and rash.   Neurological:  Negative for seizures and syncope.   All other systems reviewed and are negative.   ROS     Historical Information      Past Medical History:   Diagnosis Date    Drug abuse (HCC)     Psychiatric disorder     anxiety and ADHD    Substance abuse (HCC)         Past Surgical History:   Procedure Laterality Date     SECTION      DENTAL SURGERY          Family History   Problem Relation Age of Onset    COPD Mother         Social History      Marital Status: Single    Occupation: Unemployed    Patient Pre-hospital Living Situation: housed     Communicable diseases:    Tuberculosis (TB):   Have you traveled extensively (more than 4 weeks) outside the [place] in the last five years to high tuberculosis incidence areas ([place], [place], [place], [place])?: No  Have you resided in any of these facilities in the past year: jails, prisons, shelters, nursing homes, and other long-term care  facilities such as rehabilitation centers? If residents of any of these facilities were tested within the past three (3) navid: No  Have you had any close contact with someone diagnosed with tuberculosis?: No  Have you been homeless within the past year?: No  Have you ever injected drugs?: No  Do you or anyone in your household, currently have the following symptoms such as a sustained cough for two or more weeks, coughing up blood, fever/chills, loss of appetite, unexplained weight loss, fatigue, night sweats? : No  Do you currently have or anticipate having any condition that would decrease your immune system?: No    Viral Hepatitis:  Have you been tested for Hepatitis B or C?: Yes  Diagnosed with Hepatitis B or C?: No (Tested neg last year)    HIV:  Have you been diagnosed with HIV?: No  Do you have high risk factors for HIV including injecting drugs, multiple sexual partners, engaging in unprotected sexual activity, infected or recently treated for viral hepatitis, or infected or recently treated for a sexually transmitted disease(STD)?: No (tested neg last year)    Allergies   Allergen Reactions    Bactrim [Sulfamethoxazole-Trimethoprim]     Levaquin [Levofloxacin In D5w]         Prior to Admission medications    Medication Sig Start Date End Date Taking? Authorizing Provider   buprenorphine-naloxone (Suboxone) 8-2 mg Place 1 Film (8 mg total) under the tongue 2 (two) times a day 2/24/25  Yes Willian Lambert, DO   naloxone (NARCAN) 4 mg/0.1 mL nasal spray Administer 1 spray into a nostril. If no response after 2-3 minutes, give another dose in the other nostril using a new spray. 2/24/25 2/24/26 Yes Willian Lambert, DO   buprenorphine-naloxone (Suboxone) 8-2 mg Place 1 Film (8 mg total) under the tongue 2 (two) times a day 2/24/25 2/24/25 Yes Willian Lambert, DO   naloxone (NARCAN) 4 mg/0.1 mL nasal spray Administer 1 spray into a nostril. If no response after 2-3 minutes, give another dose in the other nostril  using a new spray. 2/24/25 2/24/25 Yes Willian Lambert DO   benzonatate (TESSALON PERLES) 100 mg capsule Take 1 capsule (100 mg total) by mouth 3 (three) times a day as needed for cough  Patient not taking: Reported on 2/24/2025 2/13/22   Rudy Carrillo PA-C   cyclobenzaprine (FLEXERIL) 10 mg tablet Take 1 tablet (10 mg total) by mouth 3 (three) times a day for 5 days  Patient not taking: Reported on 10/1/2021 3/11/21 3/16/21  Jean Carlos Lee PA-C   ibuprofen (MOTRIN) 600 mg tablet Take 600 mg by mouth every 6 (six) hours as needed for mild pain  Patient not taking: Reported on 2/24/2025    Historical Provider, MD       buprenorphine-naloxone     Objective      Vitals    Vitals:    02/24/25 0840   BP: 115/75   Pulse: 101       Physical Exam  Constitutional:       General: She is not in acute distress.     Appearance: Normal appearance.   Cardiovascular:      Rate and Rhythm: Normal rate.   Pulmonary:      Effort: Pulmonary effort is normal.   Abdominal:      General: There is no distension.   Musculoskeletal:         General: Normal range of motion.   Neurological:      Mental Status: She is alert and oriented to person, place, and time.   Psychiatric:         Mood and Affect: Mood normal.         Behavior: Behavior normal.            COWS Score:          Data:     Lab Results:                Hepatitis panel:        HIV results:       TB:        Imaging Studies: Results Review Statement: No pertinent imaging studies reviewed.     EKG, Pathology, and Other Studies: Results Review Statement: No pertinent imaging studies reviewed.     Counseling / Coordination of Care     Total time spent today 45 minutes. Greater than 50% of total time was spent with the patient and / or family counseling and / or coordination of care.         ** Please Note: This note may have been constructed using a voice recognition system. **     Willian Lambert DO

## 2025-03-20 ENCOUNTER — OFFICE VISIT (OUTPATIENT)
Dept: OTHER | Age: 45
End: 2025-03-20
Payer: COMMERCIAL

## 2025-03-20 VITALS
DIASTOLIC BLOOD PRESSURE: 74 MMHG | HEART RATE: 116 BPM | BODY MASS INDEX: 22.02 KG/M2 | HEIGHT: 64 IN | SYSTOLIC BLOOD PRESSURE: 110 MMHG | WEIGHT: 129 LBS

## 2025-03-20 DIAGNOSIS — F11.20 OPIOID USE DISORDER, SEVERE, DEPENDENCE (HCC): Primary | ICD-10-CM

## 2025-03-20 PROCEDURE — 99213 OFFICE O/P EST LOW 20 MIN: CPT | Performed by: EMERGENCY MEDICINE

## 2025-03-20 RX ORDER — BUPRENORPHINE 8 MG/1
8 TABLET SUBLINGUAL 2 TIMES DAILY
Qty: 60 TABLET | Refills: 0 | Status: SHIPPED | OUTPATIENT
Start: 2025-03-20

## 2025-03-20 NOTE — ASSESSMENT & PLAN NOTE
Pt has develop a rash of her chest and back that is itchy. She states she has has this in the past with Suboxone, but not developed it when taking Subutex  We will change to Subutex and see how it goes.   UDS today

## 2025-03-20 NOTE — PROGRESS NOTES
[facility]    Progress Note  Denise Richards 45 y.o. female MRN: [id number]  @ Encounter: [id number]      1. Opioid use disorder, severe, dependence (HCC)  Assessment & Plan:  Pt has develop a rash of her chest and back that is itchy. She states she has has this in the past with Suboxone, but not developed it when taking Subutex  We will change to Subutex and see how it goes.   UDS today   Orders:  -     buprenorphine (SUBUTEX) 8 mg; Place 1 tablet (8 mg total) under the tongue 2 (two) times a day  -     [facility] All Prescribed Meds and Special Instructions  -     Amphetamines, Methamphetamines  -     Butalbital  -     Phenobarbital  -     Secobarbital  -     Alprazolam  -     Clonazepam  -     Diazepam, Temazepam, Oxazepam  -     Lorazepam  -     Gabapentin  -     Pregabalin  -     Cocaine  -     Heroin  -     Buprenorphine  -     Levorphanol  -     Meperidine  -     Naltrexone  -     Fentanyl  -     Methadone  -     Oxycodone  -     Tapentadol  -     THC  -     Tramadol  -     Codeine, Hydrocodone, Hydropmorphone, Morphine  -     Bath Salts  -     Ethyl Glucuronide/Ethyl Sulfate  -     Kratom  -     Spice  -     Methylphenidate  -     Phentermine  -     Validity Oxidant  -     Validity Creatinine  -     Validity pH  -     Validity Specific  -     Xylazine Definitive Test        Support Services  Case Management and Certified  are following.   Patient was referred to the [facility] Certified Addiction Counselors: Yes  The patient is actively engaged with the [facility] Certified Addiction Counselor’s psychotherapy plan: If no, explain: MOUD services only    buprenorphine      PDMP reviewed:     PDMP Review         Value Time User    PDMP Reviewed  Yes 3/20/2025  8:13 AM Willian Lambert DO            SUBJECTIVE  C/o itchy rash chest and back    Drug cravings: no  Motivation to continue treatment: high      Current Outpatient Medications:     buprenorphine (SUBUTEX) 8 mg, Place 1 tablet  (8 mg total) under the tongue 2 (two) times a day, Disp: 60 tablet, Rfl: 0    naloxone (NARCAN) 4 mg/0.1 mL nasal spray, Administer 1 spray into a nostril. If no response after 2-3 minutes, give another dose in the other nostril using a new spray., Disp: 1 each, Rfl: 1    benzonatate (TESSALON PERLES) 100 mg capsule, Take 1 capsule (100 mg total) by mouth 3 (three) times a day as needed for cough (Patient not taking: Reported on 3/20/2025), Disp: 20 capsule, Rfl: 0    cyclobenzaprine (FLEXERIL) 10 mg tablet, Take 1 tablet (10 mg total) by mouth 3 (three) times a day for 5 days (Patient not taking: Reported on 10/1/2021), Disp: 15 tablet, Rfl: 0    ibuprofen (MOTRIN) 600 mg tablet, Take 600 mg by mouth every 6 (six) hours as needed for mild pain (Patient not taking: Reported on 2/24/2025), Disp: , Rfl:     Objective     Vitals:    03/20/25 1119   BP: 110/74   Pulse: (!) 116       Physical Exam  Constitutional:       General: She is not in acute distress.     Appearance: Normal appearance.   Cardiovascular:      Rate and Rhythm: Normal rate.   Pulmonary:      Effort: Pulmonary effort is normal.   Skin:     Comments: Excoriations chest and back from itching   Neurological:      Mental Status: She is alert and oriented to person, place, and time.   Psychiatric:         Mood and Affect: Mood normal.         Behavior: Behavior normal.              Lab Results:                              Counseling / Coordination of Care  Total time spent today 15 minutes. Greater than 50% of total time was spent with the patient and / or family counseling and / or coordination of care.     Willian Lambert DO

## 2025-03-23 LAB
4OH-XYLAZINE UR QL CFM: NEGATIVE NG/ML
6MAM UR QL CFM: NEGATIVE NG/ML
7AMINOCLONAZEPAM UR QL CFM: NEGATIVE NG/ML
A-OH ALPRAZ UR QL CFM: NEGATIVE NG/ML
ACCEPTABLE CREAT UR QL: NORMAL MG/DL
ACCEPTIBLE SP GR UR QL: NORMAL
AMPHET UR QL CFM: ABNORMAL NG/ML
BUPRENORPHINE UR QL CFM: NORMAL NG/ML
BUTALBITAL UR QL CFM: NEGATIVE NG/ML
BZE UR QL CFM: NEGATIVE NG/ML
CODEINE UR QL CFM: NEGATIVE NG/ML
EDDP UR QL CFM: NEGATIVE NG/ML
ETHYL GLUCURONIDE UR QL CFM: NEGATIVE NG/ML
ETHYL SULFATE UR QL SCN: NEGATIVE NG/ML
EUTYLONE UR QL: NEGATIVE NG/ML
FENTANYL UR QL CFM: NEGATIVE NG/ML
GLIADIN IGG SER IA-ACNC: NEGATIVE NG/ML
HYDROCODONE UR QL CFM: NEGATIVE NG/ML
HYDROMORPHONE UR QL CFM: NEGATIVE NG/ML
LORAZEPAM UR QL CFM: NEGATIVE NG/ML
ME-PHENIDATE UR QL CFM: NEGATIVE NG/ML
MEPERIDINE UR QL CFM: NEGATIVE NG/ML
METHADONE UR QL CFM: NEGATIVE NG/ML
METHAMPHET UR QL CFM: ABNORMAL NG/ML
MORPHINE UR QL CFM: NEGATIVE NG/ML
NALTREXONE UR QL CFM: NEGATIVE NG/ML
NITRITE UR QL: NORMAL UG/ML
NORBUPRENORPHINE UR QL CFM: NORMAL NG/ML
NORDIAZEPAM UR QL CFM: NEGATIVE NG/ML
NORFENTANYL UR QL CFM: NEGATIVE NG/ML
NORHYDROCODONE UR QL CFM: NEGATIVE NG/ML
NORMEPERIDINE UR QL CFM: NEGATIVE NG/ML
NOROXYCODONE UR QL CFM: NEGATIVE NG/ML
OXAZEPAM UR QL CFM: NEGATIVE NG/ML
OXYCODONE UR QL CFM: NEGATIVE NG/ML
OXYMORPHONE UR QL CFM: NEGATIVE NG/ML
PARA-FLUOROFENTANYL QUANTIFICATION: NORMAL NG/ML
PHENOBARB UR QL CFM: NEGATIVE NG/ML
RESULT ALL_PRESCRIBED MEDS AND SPECIAL INSTRUCTIONS: NORMAL
SECOBARBITAL UR QL CFM: NEGATIVE NG/ML
SL AMB 5F-ADB-M7 METABOLITE QUANTIFICATION: NEGATIVE NG/ML
SL AMB 7-OH-MITRAGYNINE (KRATOM ALKALOID) QUANTIFICATION: NEGATIVE NG/ML
SL AMB AB-FUBINACA-M3 METABOLITE QUANTIFICATION: NEGATIVE NG/ML
SL AMB ACETYL FENTANYL QUANTIFICATION: NORMAL NG/ML
SL AMB ACETYL NORFENTANYL QUANTIFICATION: NORMAL NG/ML
SL AMB ACRYL FENTANYL QUANTIFICATION: NORMAL NG/ML
SL AMB CARFENTANIL QUANTIFICATION: NORMAL NG/ML
SL AMB CTHC (MARIJUANA METABOLITE) QUANTIFICATION: NEGATIVE NG/ML
SL AMB DEXTRORPHAN (DEXTROMETHORPHAN METABOLITE) QUANT: NEGATIVE NG/ML
SL AMB GABAPENTIN QUANTIFICATION: NEGATIVE NG/ML
SL AMB JWH018 METABOLITE QUANTIFICATION: NEGATIVE NG/ML
SL AMB JWH073 METABOLITE QUANTIFICATION: NEGATIVE NG/ML
SL AMB MDMB-FUBINACA-M1 METABOLITE QUANTIFICATION: NEGATIVE NG/ML
SL AMB METHYLONE QUANTIFICATION: NEGATIVE NG/ML
SL AMB N-DESMETHYL-TRAMADOL QUANTIFICATION: NEGATIVE NG/ML
SL AMB PHENTERMINE QUANTIFICATION: NEGATIVE NG/ML
SL AMB PREGABALIN QUANTIFICATION: NEGATIVE NG/ML
SL AMB RCS4 METABOLITE QUANTIFICATION: NEGATIVE NG/ML
SL AMB RITALINIC ACID QUANTIFICATION: NEGATIVE NG/ML
SMOOTH MUSCLE AB TITR SER IF: NEGATIVE NG/ML
SPECIMEN DRAWN SERPL: NEGATIVE NG/ML
SPECIMEN PH ACCEPTABLE UR: NORMAL
TAPENTADOL UR QL CFM: NEGATIVE NG/ML
TEMAZEPAM UR QL CFM: NEGATIVE NG/ML
TRAMADOL UR QL CFM: NEGATIVE NG/ML
URATE/CREAT 24H UR: NEGATIVE NG/ML
XYLAZINE UR QL CFM: NEGATIVE NG/ML

## 2025-04-10 ENCOUNTER — VBI (OUTPATIENT)
Dept: ADMINISTRATIVE | Facility: OTHER | Age: 45
End: 2025-04-10

## 2025-04-10 NOTE — TELEPHONE ENCOUNTER
04/10/25 8:58 AM     Chart reviewed for Pap Smear (HPV) aka Cervical Cancer Screening ; nothing is submitted to the patient's insurance at this time.     Kenzie Merritt   [facility]

## 2025-04-17 ENCOUNTER — OFFICE VISIT (OUTPATIENT)
Dept: OTHER | Age: 45
End: 2025-04-17
Payer: COMMERCIAL

## 2025-04-17 VITALS
BODY MASS INDEX: 23.05 KG/M2 | SYSTOLIC BLOOD PRESSURE: 132 MMHG | DIASTOLIC BLOOD PRESSURE: 82 MMHG | HEART RATE: 118 BPM | HEIGHT: 64 IN | WEIGHT: 135 LBS

## 2025-04-17 DIAGNOSIS — F15.10 METHAMPHETAMINE USE (HCC): ICD-10-CM

## 2025-04-17 DIAGNOSIS — F11.20 OPIOID USE DISORDER, SEVERE, DEPENDENCE (HCC): Primary | ICD-10-CM

## 2025-04-17 PROCEDURE — 99213 OFFICE O/P EST LOW 20 MIN: CPT | Performed by: EMERGENCY MEDICINE

## 2025-04-17 RX ORDER — BUPRENORPHINE 8 MG/1
8 TABLET SUBLINGUAL 2 TIMES DAILY
Qty: 60 TABLET | Refills: 0 | Status: SHIPPED | OUTPATIENT
Start: 2025-04-17

## 2025-04-17 NOTE — ASSESSMENT & PLAN NOTE
We discussed results from UDS last month, +amphet and meth. She states he had previously been diagnosed with ADHD at [facility] and had been prescribed Adderall. However, she is no linger prescribed that. She states she has been buying Adderall pills to self-medicate. However, that would not turn meth positive, only amphetamine. So, these are either counterfeit pressed meth or she is using something else.   She states her last use was yesterday  We discussed having proper evaluation and proper diagnosis for ADHD. And, if she has that, it should be treated via medical providers. I also informed her that there are non-stimulant meds also available for ADHD.   She will follow up with PCP for proper evaluation and treatment   We will monitor and continue [facility] recovery support. We will assist with additional resources if needed for ongoing use

## 2025-04-17 NOTE — ASSESSMENT & PLAN NOTE
Doing well without cravings or opioid use  She states the rash she had is now gone after switching to Subutex  UDS reviewed from last visit. No evidence of diversion or harmful opioid use  Will continue current treatment

## 2025-04-17 NOTE — PROGRESS NOTES
[facility]    Progress Note  Denise Richards 45 y.o. female MRN: [id number]  @ Encounter: [id number]      1. Opioid use disorder, severe, dependence (HCC)  Assessment & Plan:  Doing well without cravings or opioid use  She states the rash she had is now gone after switching to Subutex  UDS reviewed from last visit. No evidence of diversion or harmful opioid use  Will continue current treatment  Orders:  -     buprenorphine (SUBUTEX) 8 mg; Place 1 tablet (8 mg total) under the tongue 2 (two) times a day  2. Methamphetamine use (HCC)  Assessment & Plan:  We discussed results from UDS last month, +amphet and meth. She states he had previously been diagnosed with ADHD at [facility] and had been prescribed Adderall. However, she is no linger prescribed that. She states she has been buying Adderall pills to self-medicate. However, that would not turn meth positive, only amphetamine. So, these are either counterfeit pressed meth or she is using something else.   She states her last use was yesterday  We discussed having proper evaluation and proper diagnosis for ADHD. And, if she has that, it should be treated via medical providers. I also informed her that there are non-stimulant meds also available for ADHD.   She will follow up with PCP for proper evaluation and treatment   We will monitor and continue [facility] recovery support. We will assist with additional resources if needed for ongoing use        Support Services  Case Management and Certified  are following.   Patient was referred to the [facility] Certified Addiction Counselors: Yes  The patient is actively engaged with the [facility] Certified Addiction Counselor’s psychotherapy plan: If no, explain: MOUD services only    buprenorphine      PDMP reviewed:     PDMP Review         Value Time User    PDMP Reviewed  Yes 4/17/2025  8:17 AM Willian Lambert, DO            SUBJECTIVE  Feels well. No complaints.     Drug cravings:  no  Motivation to continue treatment: high      Current Outpatient Medications:     buprenorphine (SUBUTEX) 8 mg, Place 1 tablet (8 mg total) under the tongue 2 (two) times a day, Disp: 60 tablet, Rfl: 0    ibuprofen (MOTRIN) 600 mg tablet, Take 600 mg by mouth every 6 (six) hours as needed for mild pain, Disp: , Rfl:     naloxone (NARCAN) 4 mg/0.1 mL nasal spray, Administer 1 spray into a nostril. If no response after 2-3 minutes, give another dose in the other nostril using a new spray., Disp: 1 each, Rfl: 1    benzonatate (TESSALON PERLES) 100 mg capsule, Take 1 capsule (100 mg total) by mouth 3 (three) times a day as needed for cough (Patient not taking: Reported on 2/24/2025), Disp: 20 capsule, Rfl: 0    cyclobenzaprine (FLEXERIL) 10 mg tablet, Take 1 tablet (10 mg total) by mouth 3 (three) times a day for 5 days (Patient not taking: Reported on 10/1/2021), Disp: 15 tablet, Rfl: 0    Objective     Vitals:    04/17/25 0902   BP: 132/82   Pulse: (!) 118       Physical Exam  Constitutional:       General: She is not in acute distress.     Appearance: Normal appearance.   Cardiovascular:      Rate and Rhythm: Tachycardia present.   Pulmonary:      Effort: Pulmonary effort is normal.   Neurological:      Mental Status: She is alert and oriented to person, place, and time.   Psychiatric:         Mood and Affect: Mood normal.         Behavior: Behavior normal.              Lab Results:                              Counseling / Coordination of Care  Total time spent today 15 minutes. Greater than 50% of total time was spent with the patient and / or family counseling and / or coordination of care.     Willian Lambert DO

## 2025-05-19 DIAGNOSIS — F11.20 OPIOID USE DISORDER, SEVERE, DEPENDENCE (HCC): ICD-10-CM

## 2025-05-19 RX ORDER — BUPRENORPHINE 8 MG/1
8 TABLET SUBLINGUAL 2 TIMES DAILY
Qty: 60 TABLET | Refills: 0 | Status: SHIPPED | OUTPATIENT
Start: 2025-05-19

## 2025-05-19 NOTE — TELEPHONE ENCOUNTER
Medication Refill Request for: buprenorphine (SUBUTEX) 8 mg     When was the patient last seen by MAT provider? 04/17/2025    Have they had any cancellations or no-shows since the last visit? Yes [x] No [] Cancelled 05/19/2025 due to COVID-Positive     When is the next appointment scheduled? 05/29/2025    Verified pharmacy   [x]    Verified ordering Provider   [x]    Does patient have enough for the next 3 days? Yes [] No [x]

## 2025-06-26 ENCOUNTER — OFFICE VISIT (OUTPATIENT)
Dept: OTHER | Age: 45
End: 2025-06-26
Payer: COMMERCIAL

## 2025-06-26 VITALS
DIASTOLIC BLOOD PRESSURE: 80 MMHG | HEART RATE: 112 BPM | SYSTOLIC BLOOD PRESSURE: 126 MMHG | BODY MASS INDEX: 21.28 KG/M2 | WEIGHT: 124 LBS

## 2025-06-26 DIAGNOSIS — F15.10 METHAMPHETAMINE USE (HCC): ICD-10-CM

## 2025-06-26 DIAGNOSIS — F11.20 OPIOID USE DISORDER, SEVERE, DEPENDENCE (HCC): Primary | ICD-10-CM

## 2025-06-26 PROCEDURE — 99214 OFFICE O/P EST MOD 30 MIN: CPT | Performed by: EMERGENCY MEDICINE

## 2025-06-26 RX ORDER — BUPRENORPHINE 8 MG/1
8 TABLET SUBLINGUAL 2 TIMES DAILY
Qty: 60 TABLET | Refills: 0 | Status: SHIPPED | OUTPATIENT
Start: 2025-06-26

## 2025-06-26 NOTE — PROGRESS NOTES
[facility]    Progress Note  Denise Richards 45 y.o. female MRN: [id number]  @ Encounter: [id number]      1. Opioid use disorder, severe, dependence (HCC)  Assessment & Plan:  Pt ran out of her Subutex last week. She states she called for a refill, but was told she needed to wait until this appt. I don't see any documentation of her calling for a refill  She is feeling withdrawal  New Rx sent in  Her insurance has lapsed and she is in the process of trying to get it reinstated. I will message [facility] CM to see if we can provide any assistance.   She is using [facility] for now, which is allowing Rx fills for about $40.  Orders:  -     buprenorphine (SUBUTEX) 8 mg; Place 1 tablet (8 mg total) under the tongue 2 (two) times a day  -     [facility] All Prescribed Meds and Special Instructions  -     Amphetamines, Methamphetamines  -     Butalbital  -     Phenobarbital  -     Secobarbital  -     Alprazolam  -     Clonazepam  -     Diazepam, Temazepam, Oxazepam  -     Lorazepam  -     Gabapentin  -     Pregabalin  -     Cocaine  -     Heroin  -     Buprenorphine  -     Levorphanol  -     Meperidine  -     Naltrexone  -     Fentanyl  -     Methadone  -     Oxycodone  -     Tapentadol  -     THC  -     Tramadol  -     Codeine, Hydrocodone, Hydropmorphone, Morphine  -     Bath Salts  -     Ethyl Glucuronide/Ethyl Sulfate  -     Kratom  -     Spice  -     Methylphenidate  -     Phentermine  -     Validity Oxidant  -     Validity Creatinine  -     Validity pH  -     Validity Specific  -     Xylazine Definitive Test  2. Methamphetamine use (HCC)  Assessment & Plan:  Still using.   Motivational interviewing provided  Will plan UDS next visit. If still struggling, may need higher level of care.         Support Services  Case Management and Certified  are following.   Patient was referred to the [facility] Certified Addiction Counselors: Yes  The patient is actively engaged with the [facility] Certified  Addiction Counselor’s psychotherapy plan: If no, explain: MOUD services only    buprenorphine      PDMP reviewed:     PDMP Review         Value Time User    PDMP Reviewed  Yes 6/26/2025 10:26 AM Willian Lambert DO            SUBJECTIVE  Ran out of Subutex a week ago. Feel withdrawal.     Drug cravings: yes  Motivation to continue treatment: high    Current Medications[1]    Objective     Vitals:    06/26/25 1417   BP: 126/80   Pulse: (!) 112       Physical Exam  Constitutional:       General: She is not in acute distress.    Cardiovascular:      Rate and Rhythm: Tachycardia present.   Pulmonary:      Effort: Pulmonary effort is normal.     Neurological:      Mental Status: She is alert and oriented to person, place, and time.     Psychiatric:         Mood and Affect: Mood normal.         Behavior: Behavior normal.              Lab Results:                              Counseling / Coordination of Care  Total time spent today 20 minutes. Greater than 50% of total time was spent with the patient and / or family counseling and / or coordination of care.     Willian Lambert DO             [1]   Current Outpatient Medications:     buprenorphine (SUBUTEX) 8 mg, Place 1 tablet (8 mg total) under the tongue 2 (two) times a day, Disp: 60 tablet, Rfl: 0    ibuprofen (MOTRIN) 600 mg tablet, Take 600 mg by mouth every 6 (six) hours as needed for mild pain, Disp: , Rfl:     naloxone (NARCAN) 4 mg/0.1 mL nasal spray, Administer 1 spray into a nostril. If no response after 2-3 minutes, give another dose in the other nostril using a new spray., Disp: 1 each, Rfl: 1    benzonatate (TESSALON PERLES) 100 mg capsule, Take 1 capsule (100 mg total) by mouth 3 (three) times a day as needed for cough (Patient not taking: Reported on 6/26/2025), Disp: 20 capsule, Rfl: 0    cyclobenzaprine (FLEXERIL) 10 mg tablet, Take 1 tablet (10 mg total) by mouth 3 (three) times a day for 5 days (Patient not taking: Reported on 10/1/2021), Disp: 15  tablet, Rfl: 0

## 2025-06-26 NOTE — ASSESSMENT & PLAN NOTE
Still using.   Motivational interviewing provided  Will plan UDS next visit. If still struggling, may need higher level of care.

## 2025-06-26 NOTE — ASSESSMENT & PLAN NOTE
Pt ran out of her Subutex last week. She states she called for a refill, but was told she needed to wait until this appt. I don't see any documentation of her calling for a refill  She is feeling withdrawal  New Rx sent in  Her insurance has lapsed and she is in the process of trying to get it reinstated. I will message [facility] CM to see if we can provide any assistance.   She is using [facility] for now, which is allowing Rx fills for about $40.

## 2025-07-28 ENCOUNTER — TELEPHONE (OUTPATIENT)
Dept: PSYCHIATRY | Facility: CLINIC | Age: 45
End: 2025-07-28

## 2025-07-28 DIAGNOSIS — F11.20 OPIOID USE DISORDER, SEVERE, DEPENDENCE (HCC): ICD-10-CM

## 2025-07-28 RX ORDER — BUPRENORPHINE 8 MG/1
8 TABLET SUBLINGUAL 2 TIMES DAILY
Qty: 28 TABLET | Refills: 0 | Status: SHIPPED | OUTPATIENT
Start: 2025-07-28 | End: 2025-08-11

## 2025-08-14 ENCOUNTER — OFFICE VISIT (OUTPATIENT)
Dept: OTHER | Age: 45
End: 2025-08-14
Payer: COMMERCIAL